# Patient Record
Sex: MALE | Race: WHITE
[De-identification: names, ages, dates, MRNs, and addresses within clinical notes are randomized per-mention and may not be internally consistent; named-entity substitution may affect disease eponyms.]

---

## 2018-11-12 ENCOUNTER — HOSPITAL ENCOUNTER (EMERGENCY)
Dept: HOSPITAL 74 - JERFT | Age: 33
Discharge: HOME | End: 2018-11-12
Payer: COMMERCIAL

## 2018-11-12 VITALS — SYSTOLIC BLOOD PRESSURE: 120 MMHG | DIASTOLIC BLOOD PRESSURE: 76 MMHG | HEART RATE: 63 BPM

## 2018-11-12 VITALS — BODY MASS INDEX: 30.8 KG/M2
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
## 2018-11-12 DIAGNOSIS — S09.8XXA: Primary | ICD-10-CM

## 2018-11-12 NOTE — PDOC
History of Present Illness





- General


Chief Complaint: Injury


Stated Complaint: INJURY


Time Seen by Provider: 11/12/18 10:12


History Source: Patient


Exam Limitations: No Limitations





- History of Present Illness


Initial Comments: 





11/12/18 10:24


33 yr male [place] [place] [employer] at work today when a ladder was being folded and fell hit 

him on top of his head wearing baseball cap not helmet. no LOC. no vomiting. Pt 

c/o feeling "dizzy" only had coffee this am no food as of yet. Pt has no PMHX. 

no allergies. 





Past History





- Past Medical History


Allergies/Adverse Reactions: 


 Allergies











Allergy/AdvReac Type Severity Reaction Status Date / Time


 


hops Allergy   Verified 11/12/18 10:07


 


lactose Allergy   Verified 11/12/18 10:07


 


seasonal Allergy   Uncoded 11/12/18 10:07











Home Medications: 


Ambulatory Orders





Acetaminophen [Tylenol] 325 mg PO PRN PRN 09/01/16 


Albuterol Sulfate Inhaler - [Ventolin Hfa Inhaler -] 1 - 2 inh PO DAILY PRN 09/ 01/16 


Lactase [Lactaid] 3,000 unit PO PRN 09/01/16 








Anemia: No


Asthma: No ("small airway disease")


Cancer: No


Cardiac Disorders: No


CVA: No


COPD: No


CHF: No


Dementia: No


Diabetes: No


GI Disorders: No


 Disorders: No


HTN: No


Hypercholesterolemia: No


Liver Disease: No


Seizures: No


Thyroid Disease: No





- Suicide/Smoking/Psychosocial Hx


Smoking History: Never smoked


Have you smoked in the past 12 months: No


Cigars Per Day: 1


'Breaking Loose' booklet given: 09/01/16


Hx Alcohol Use: No


Drug/Substance Use Hx: No


Substance Use Type: None





*Physical Exam





- Vital Signs


 Last Vital Signs











Temp Pulse Resp BP Pulse Ox


 


    63   18   120/76   99 


 


    11/12/18 10:08  11/12/18 10:08  11/12/18 10:08  11/12/18 10:08














- Physical Exam


General Appearance: Yes: Nourished, Appropriately Dressed


HEENT: positive: EOMI, CRISTIN, Normal ENT Inspection, TMs Normal, Pharynx Normal


Neck: positive: Supple, Other (FROM neg tenderness ).  negative: Tender, Tender 

lateral


Respiratory/Chest: positive: Lungs Clear, Normal Breath Sounds.  negative: 

Chest Tender


Cardiovascular: positive: Regular Rhythm, Regular Rate


Gastrointestinal/Abdominal: positive: Normal Bowel Sounds, Soft


Musculoskeletal: positive: Normal Inspection


Extremity: positive: Normal Capillary Refill, Normal Inspection, Normal Range 

of Motion


Integumentary: positive: Normal Color, Dry, Warm


Neurologic: positive: Fully Oriented, Alert, Normal Mood/Affect, Normal Response

, Motor Strength 5/5





Medical Decision Making





- Medical Decision Making





11/12/18 10:26


cc: head injury at work ladder fell on head no LOC, no palpable hematoma , skin 

intact no redness to scalp 


no LOC no vomiting , pt is AOX3


will give tylenol 


strict head injury dc inst verbally discussed


pt placed off duty until cleared by employee health 





pt understands the plan all questions asked and answered 


11/12/18 10:45








*DC/Admit/Observation/Transfer


Diagnosis at time of Disposition: 


Head injury due to trauma


Qualifiers:


 Encounter type: initial encounter Qualified Code(s): S09.90XA - Unspecified 

injury of head, initial encounter








- Discharge Dispostion


Disposition: HOME


Condition at time of disposition: Good





- Referrals





- Patient Instructions


Printed Discharge Instructions:  DI for Closed Head Injury


Additional Instructions: 


take tylenol 650mg every 4-6hrs for pain or headache


drink pleanty of water today avoid caffeine and alcohol for the next 24-48hrs 


return to ER for any vomiting severe headache 





go to Employee Health for follow up to be cleared for return to work








- Post Discharge Activity


Forms/Work/School Notes:  Back to Work

## 2021-08-03 ENCOUNTER — APPOINTMENT (OUTPATIENT)
Dept: HUMAN REPRODUCTION | Facility: CLINIC | Age: 36
End: 2021-08-03

## 2021-09-09 ENCOUNTER — APPOINTMENT (OUTPATIENT)
Dept: HUMAN REPRODUCTION | Facility: CLINIC | Age: 36
End: 2021-09-09
Payer: COMMERCIAL

## 2021-09-09 PROCEDURE — XXXXX: CPT

## 2021-09-09 PROCEDURE — 36415 COLL VENOUS BLD VENIPUNCTURE: CPT

## 2022-01-31 PROBLEM — Z00.00 ENCOUNTER FOR PREVENTIVE HEALTH EXAMINATION: Status: ACTIVE | Noted: 2022-01-31

## 2025-05-06 ENCOUNTER — NON-APPOINTMENT (OUTPATIENT)
Age: 40
End: 2025-05-06

## 2025-05-07 ENCOUNTER — RX ONLY (RX ONLY)
Age: 40
End: 2025-05-07

## 2025-05-07 ENCOUNTER — OFFICE (OUTPATIENT)
Facility: LOCATION | Age: 40
Setting detail: OPHTHALMOLOGY
End: 2025-05-07
Payer: COMMERCIAL

## 2025-05-07 DIAGNOSIS — S05.02XA: ICD-10-CM

## 2025-05-07 DIAGNOSIS — H16.223: ICD-10-CM

## 2025-05-07 PROCEDURE — 92004 COMPRE OPH EXAM NEW PT 1/>: CPT | Performed by: OPHTHALMOLOGY

## 2025-05-07 ASSESSMENT — REFRACTION_MANIFEST
OS_AXIS: 056
OS_VA1: 20/20-2
OD_SPHERE: PL
OS_SPHERE: +0.25
OD_AXIS: 139
OS_CYLINDER: -0.75
OD_CYLINDER: -0.25
OD_VA1: 20/20-1

## 2025-05-07 ASSESSMENT — TEAR BREAK UP TIME (TBUT)
OS_TBUT: 2+
OD_TBUT: 2+

## 2025-05-07 ASSESSMENT — REFRACTION_AUTOREFRACTION
OD_AXIS: 139
OD_SPHERE: PL
OS_AXIS: 056
OS_SPHERE: +0.25
OD_CYLINDER: -0.25
OS_CYLINDER: -0.75

## 2025-05-07 ASSESSMENT — CONFRONTATIONAL VISUAL FIELD TEST (CVF)
OD_FINDINGS: FULL
OS_FINDINGS: FULL

## 2025-05-07 ASSESSMENT — VISUAL ACUITY
OD_BCVA: 20/25
OS_BCVA: 20/20

## 2025-05-07 ASSESSMENT — KERATOMETRY
OD_K2POWER_DIOPTERS: 44.25
OS_K1POWER_DIOPTERS: 45.00
OS_AXISANGLE_DEGREES: 124
OD_AXISANGLE_DEGREES: 078
METHOD_AUTO_MANUAL: AUTO
OS_K2POWER_DIOPTERS: 45.25
OD_K1POWER_DIOPTERS: 44.00

## 2025-05-07 ASSESSMENT — TONOMETRY
OD_IOP_MMHG: 20
OS_IOP_MMHG: 20

## 2025-05-07 ASSESSMENT — CORNEAL TRAUMA - ABRASION: OS_ABRASION: PRESENT
